# Patient Record
Sex: MALE | Race: BLACK OR AFRICAN AMERICAN | NOT HISPANIC OR LATINO | URBAN - METROPOLITAN AREA
[De-identification: names, ages, dates, MRNs, and addresses within clinical notes are randomized per-mention and may not be internally consistent; named-entity substitution may affect disease eponyms.]

---

## 2017-08-14 ENCOUNTER — HOSPITAL ENCOUNTER (EMERGENCY)
Facility: HOSPITAL | Age: 27
Discharge: HOME | End: 2017-08-14
Attending: EMERGENCY MEDICINE

## 2017-08-14 VITALS
SYSTOLIC BLOOD PRESSURE: 127 MMHG | HEIGHT: 67 IN | OXYGEN SATURATION: 97 % | WEIGHT: 250 LBS | DIASTOLIC BLOOD PRESSURE: 65 MMHG | RESPIRATION RATE: 18 BRPM | BODY MASS INDEX: 39.24 KG/M2 | TEMPERATURE: 99 F | HEART RATE: 77 BPM

## 2017-08-14 DIAGNOSIS — J02.9 SORE THROAT: ICD-10-CM

## 2017-08-14 DIAGNOSIS — R50.9 FEVER, UNSPECIFIED FEVER CAUSE: Primary | ICD-10-CM

## 2017-08-14 LAB
ALBUMIN SERPL BCP-MCNC: 3.2 G/DL
ALP SERPL-CCNC: 139 U/L
ALT SERPL W/O P-5'-P-CCNC: 86 U/L
ANION GAP SERPL CALC-SCNC: 10 MMOL/L
AST SERPL-CCNC: 93 U/L
BASOPHILS # BLD AUTO: 0.02 K/UL
BASOPHILS NFR BLD: 0.2 %
BILIRUB SERPL-MCNC: 0.9 MG/DL
BUN SERPL-MCNC: 11 MG/DL
CALCIUM SERPL-MCNC: 9.5 MG/DL
CHLORIDE SERPL-SCNC: 100 MMOL/L
CO2 SERPL-SCNC: 24 MMOL/L
CREAT SERPL-MCNC: 1 MG/DL
DEPRECATED S PYO AG THROAT QL EIA: NEGATIVE
DIFFERENTIAL METHOD: ABNORMAL
EOSINOPHIL # BLD AUTO: 0 K/UL
EOSINOPHIL NFR BLD: 0 %
ERYTHROCYTE [DISTWIDTH] IN BLOOD BY AUTOMATED COUNT: 13.6 %
EST. GFR  (AFRICAN AMERICAN): >60 ML/MIN/1.73 M^2
EST. GFR  (NON AFRICAN AMERICAN): >60 ML/MIN/1.73 M^2
GLUCOSE SERPL-MCNC: 110 MG/DL
HCT VFR BLD AUTO: 40.8 %
HETEROPH AB SERPL QL IA: NEGATIVE
HGB BLD-MCNC: 13.7 G/DL
LACTATE SERPL-SCNC: 1.2 MMOL/L
LYMPHOCYTES # BLD AUTO: 1.5 K/UL
LYMPHOCYTES NFR BLD: 13.9 %
MCH RBC QN AUTO: 28.2 PG
MCHC RBC AUTO-ENTMCNC: 33.6 G/DL
MCV RBC AUTO: 84 FL
MONOCYTES # BLD AUTO: 1.4 K/UL
MONOCYTES NFR BLD: 13.1 %
NEUTROPHILS # BLD AUTO: 7.6 K/UL
NEUTROPHILS NFR BLD: 72.4 %
PLATELET # BLD AUTO: 293 K/UL
PMV BLD AUTO: 10 FL
POTASSIUM SERPL-SCNC: 3.8 MMOL/L
PROT SERPL-MCNC: 8.3 G/DL
RBC # BLD AUTO: 4.86 M/UL
SODIUM SERPL-SCNC: 134 MMOL/L
WBC # BLD AUTO: 10.52 K/UL

## 2017-08-14 PROCEDURE — 80053 COMPREHEN METABOLIC PANEL: CPT

## 2017-08-14 PROCEDURE — 99285 EMERGENCY DEPT VISIT HI MDM: CPT | Mod: ,,, | Performed by: PHYSICIAN ASSISTANT

## 2017-08-14 PROCEDURE — 86645 CMV ANTIBODY IGM: CPT

## 2017-08-14 PROCEDURE — 87799 DETECT AGENT NOS DNA QUANT: CPT

## 2017-08-14 PROCEDURE — 87880 STREP A ASSAY W/OPTIC: CPT

## 2017-08-14 PROCEDURE — 25000003 PHARM REV CODE 250: Performed by: PHYSICIAN ASSISTANT

## 2017-08-14 PROCEDURE — 63600175 PHARM REV CODE 636 W HCPCS: Performed by: PHYSICIAN ASSISTANT

## 2017-08-14 PROCEDURE — 96365 THER/PROPH/DIAG IV INF INIT: CPT

## 2017-08-14 PROCEDURE — 80074 ACUTE HEPATITIS PANEL: CPT

## 2017-08-14 PROCEDURE — 96366 THER/PROPH/DIAG IV INF ADDON: CPT

## 2017-08-14 PROCEDURE — 87081 CULTURE SCREEN ONLY: CPT | Mod: 59

## 2017-08-14 PROCEDURE — 85025 COMPLETE CBC W/AUTO DIFF WBC: CPT

## 2017-08-14 PROCEDURE — 83605 ASSAY OF LACTIC ACID: CPT

## 2017-08-14 PROCEDURE — 86644 CMV ANTIBODY: CPT

## 2017-08-14 PROCEDURE — S0077 INJECTION, CLINDAMYCIN PHOSP: HCPCS | Performed by: PHYSICIAN ASSISTANT

## 2017-08-14 PROCEDURE — 87081 CULTURE SCREEN ONLY: CPT

## 2017-08-14 PROCEDURE — 99284 EMERGENCY DEPT VISIT MOD MDM: CPT | Mod: 25

## 2017-08-14 PROCEDURE — 86665 EPSTEIN-BARR CAPSID VCA: CPT

## 2017-08-14 PROCEDURE — 86308 HETEROPHILE ANTIBODY SCREEN: CPT

## 2017-08-14 PROCEDURE — 96375 TX/PRO/DX INJ NEW DRUG ADDON: CPT

## 2017-08-14 RX ORDER — CEFUROXIME AXETIL 500 MG/1
500 TABLET ORAL
COMMUNITY

## 2017-08-14 RX ORDER — DEXAMETHASONE SODIUM PHOSPHATE 4 MG/ML
8 INJECTION, SOLUTION INTRA-ARTICULAR; INTRALESIONAL; INTRAMUSCULAR; INTRAVENOUS; SOFT TISSUE
Status: COMPLETED | OUTPATIENT
Start: 2017-08-14 | End: 2017-08-14

## 2017-08-14 RX ORDER — CLINDAMYCIN PHOSPHATE 600 MG/50ML
600 INJECTION, SOLUTION INTRAVENOUS
Status: COMPLETED | OUTPATIENT
Start: 2017-08-14 | End: 2017-08-14

## 2017-08-14 RX ORDER — IBUPROFEN 800 MG/1
800 TABLET ORAL 3 TIMES DAILY
COMMUNITY

## 2017-08-14 RX ORDER — ACETAMINOPHEN 500 MG
1000 TABLET ORAL
Status: COMPLETED | OUTPATIENT
Start: 2017-08-14 | End: 2017-08-14

## 2017-08-14 RX ORDER — ONDANSETRON 2 MG/ML
4 INJECTION INTRAMUSCULAR; INTRAVENOUS
Status: COMPLETED | OUTPATIENT
Start: 2017-08-14 | End: 2017-08-14

## 2017-08-14 RX ADMIN — ONDANSETRON 4 MG: 2 INJECTION INTRAMUSCULAR; INTRAVENOUS at 02:08

## 2017-08-14 RX ADMIN — CLINDAMYCIN IN 5 PERCENT DEXTROSE 600 MG: 12 INJECTION, SOLUTION INTRAVENOUS at 02:08

## 2017-08-14 RX ADMIN — DEXAMETHASONE SODIUM PHOSPHATE 8 MG: 4 INJECTION, SOLUTION INTRAMUSCULAR; INTRAVENOUS at 02:08

## 2017-08-14 RX ADMIN — SODIUM CHLORIDE 1000 ML: 0.9 INJECTION, SOLUTION INTRAVENOUS at 02:08

## 2017-08-14 RX ADMIN — ACETAMINOPHEN 1000 MG: 500 TABLET ORAL at 02:08

## 2017-08-14 NOTE — DISCHARGE INSTRUCTIONS
Take ibuprofen and tylenol for fever and body aches. No contact sports. Continue abx until complete.

## 2017-08-14 NOTE — ED NOTES
Pt identifiers Abebe Jha was checked and is correct  LOC: The patient is awake, alert, aware of environment with an appropriate affect.  APPEARANCE: Pt , in no acute distress,  SKIN: Skin warm, dry and intact  RESPIRATORY: Airway is open and patent, respirations are spontaneous,  ABDOMEN: . Bowel sounds present complaints of n/v x 4 days.   NEUROLOGIC:  patient moving all extremities spontaneously, normal sensation in all extremities when touched with a finger. MUSCULOSKELETAL: No obvious deformities.

## 2017-08-14 NOTE — ED PROVIDER NOTES
"Encounter Date: 8/14/2017    SCRIBE #1 NOTE: I, Sonya Duarte, am scribing for, and in the presence of, Odalys Raymond PA-C.   SCRIBE #2 NOTE: I, Kala Booth, am scribing for, and in the presence of,  Dr. Napier. I have scribed the following portions of the note - the APC attestation.     History     Chief Complaint   Patient presents with    Sore Throat     fever, seen by md on sat meds not working     Time seen by provider: 2:12 PM    This is a 27 y.o. male who presents with complaint of sore throat with associated fever over the last 2 weeks with worsening over the past 4 days. Pt describe significant throat pain and states that it "feels like he is being hit with a hammer." He has been taking 3 tablets of Ibuprofen 200 mg and last took it 8 hours ago. Also endorses associated generalized body aches, intermittent headaches, dark urine, lightheadedness upon standing, coughing with streaks of blood in sputum, sneezing, and rhinorrhea. All symptoms are most severe in the morning. Pt describes an episode of SOB and vomiting last night. Pt was seen at seen at [facility] four days ago, where he was prescribed cefuroxime with no improvement. He has not completed the course of antibiotics, but denies improvement since starting them. He was then seen at the [facility] 3 days ago and diagnosed with strep throat. He was given an IM injection and discharged. Pt has 16 pills left in the bottle of cefuroxime 500 mg BID.  Reports diagnosis of gonorrhea in his throat a month ago and states it improved with treatment. States his current sore throat is much more severe. He denies diarrhea, ear pain, leg swelling, or penile discharge.      The history is provided by the patient.     Review of patient's allergies indicates:  No Known Allergies  Past Medical History:   Diagnosis Date    Gonorrhea     gonorrhea of throat 2 months ago     History reviewed. No pertinent surgical history.  Family History   Problem " Relation Age of Onset    Diabetes Mother     Diabetes Father      Social History   Substance Use Topics    Smoking status: Current Every Day Smoker     Packs/day: 0.50     Types: Cigarettes    Smokeless tobacco: Current User    Alcohol use Yes     Review of Systems   Constitutional: Positive for fever. Negative for chills and diaphoresis.   HENT: Positive for rhinorrhea, sneezing and sore throat. Negative for congestion, ear pain and postnasal drip.    Respiratory: Positive for cough and shortness of breath. Negative for chest tightness.    Cardiovascular: Negative for chest pain, palpitations and leg swelling.   Gastrointestinal: Positive for vomiting. Negative for abdominal pain, constipation, diarrhea and nausea.   Genitourinary: Negative for discharge, dysuria and hematuria.   Musculoskeletal: Positive for myalgias. Negative for arthralgias.   Neurological: Positive for light-headedness and headaches. Negative for dizziness, weakness and numbness.   All other systems reviewed and are negative.      Physical Exam     Initial Vitals [08/14/17 1203]   BP Pulse Resp Temp SpO2   139/68 (!) 112 18 (!) 103.1 °F (39.5 °C) 97 %      MAP       91.67         Physical Exam    Nursing note and vitals reviewed.  Constitutional: He appears well-developed and well-nourished. He is not diaphoretic. No distress.   HENT:   Head: Normocephalic and atraumatic.   Mouth/Throat: No tonsillar abscesses.   Bilateral tonsillar erythema, edema, and exudate noted. Uvula midline. No signs of peritonsillar abscess.   Neck: Normal range of motion. Neck supple.   Cardiovascular: Regular rhythm and normal heart sounds. Tachycardia present.  Exam reveals no gallop and no friction rub.    No murmur heard.  Pulmonary/Chest: Breath sounds normal. He has no wheezes. He has no rhonchi. He has no rales.   Abdominal: Soft. Bowel sounds are normal. There is no tenderness. There is no rebound and no guarding.   Musculoskeletal: Normal range of  motion.   Lymphadenopathy:        Head (right side): Submandibular adenopathy present.        Head (left side): Submandibular adenopathy present.     He has cervical adenopathy (bilateral anterior cervical).   Neurological: He is alert and oriented to person, place, and time.   Skin: Skin is warm and dry. No rash noted. No erythema.   Psychiatric: He has a normal mood and affect.         ED Course   Procedures  Labs Reviewed   CBC W/ AUTO DIFFERENTIAL - Abnormal; Notable for the following:        Result Value    Hemoglobin 13.7 (*)     Mono # 1.4 (*)     Lymph% 13.9 (*)     All other components within normal limits   COMPREHENSIVE METABOLIC PANEL - Abnormal; Notable for the following:     Sodium 134 (*)     Albumin 3.2 (*)     Alkaline Phosphatase 139 (*)     AST 93 (*)     ALT 86 (*)     All other components within normal limits   THROAT SCREEN, RAPID   CULTURE, GONOCOCCUS   CULTURE, STREP A,  THROAT   LACTIC ACID, PLASMA   HETEROPHILE AB SCREEN   TONG-GARDUNO VIRUS DNA, QUANTITATIVE (PCR)   TONG-BARR VIRUS ANTIBODY PANEL   CYTOMEGALOVIRUS ANTIBODY, IGG   CYTOMEGALOVIRUS (CMV) AB, IGM   HEPATITIS PANEL, ACUTE   CMV DNA, QUANTITATIVE, PCR        Imaging Results          US Abdomen Limited (Final result)  Result time 08/14/17 17:56:29    Final result by Shanda Redding MD (08/14/17 17:56:29)                 Impression:        Mild hepatomegaly.    ______________________________________     Electronically signed by resident: KEESHA ARAMBULA MD  Date:     08/14/17  Time:    17:50            As the supervising and teaching physician, I personally reviewed the images and resident's interpretation and I agree with the findings.          Electronically signed by: SHANDA REDDING MD  Date:     08/14/17  Time:    17:56              Narrative:    Time of Procedure: 08/14/17 17:00:00  Accession # [id number]    Reason for study: Elevated liver enzymes    Comparison: None.    Technique: Abdominal ultrasound was  performed.    Findings: The liver is upper limit of normal size measuring 17.2cm.  Hepatic parenchyma is homogeneous without evidence for masses.  No intra- or extrahepatic biliary ductal dilatation. The common bile duct measures 0.3 cm.  The gallbladder is unremarkable. No evidence for cholelithiasis.  Sonographic Gudino's sign is negative. The visualized portions of the pancreas, aorta and IVC appear unremarkable. The spleen is upper limit of normal in size and measures 11.7 x 5.1 cm. No ascites.                                 Medical Decision Making:   History:   Old Medical Records: I decided to obtain old medical records.  Old Records Summarized: other records.       <> Summary of Records: [facility] Was apparently seen at the [facility] ED. Told he had strep throat. Given IM injection and seen at [facility] on Thursday where he was prescribed cefuroxime with no improvement.   Clinical Tests:   Lab Tests: Ordered and Reviewed  Radiological Study: Ordered and Reviewed       APC / Resident Notes:   This is a 27 y.o. male who presents with complaint of sore throat with associated fever over the last 2 weeks with worsening over the past 4 days. Febrile at 104.5. Tachycardic and regular rhythm. Lungs are clear to auscultation. Abdomen soft. Bilateral tonsillar erythema, edema, and exudate noted. Uvula midline. No signs of peritonsillar abscess. Bilateral submandibular and anterior cervical lymphadenopathy. DDx includes but is not limited to strep pharyngitis, mononucleosis, gonorrhea of the throat, sepsis. Will obtain labs, give IV fluids, one gram of Tylenol, and Zofran, and reassess.      CBC with no leukocytosis with WBC 10.52. CMP shows elevated LFTs: Alk Phos 139, AST 93, ALT 86. Rapid strep negative. Monospot negative. Gonorrhea throat culture pending. Lactic acid 1.2.    EBV, CMV, and hepatitis added. Will obtain RUQ ultrasound.    RUQ ultrasound with no hepatomegaly.    Repeat vital  signs: 99F, Pulse 77, /65.    I have strong suspicion that symptoms caused by mononucleosis. I do not feel that he needs any further labs or imaging at this time. Stable for discharge.    He was discharged without any new prescriptions.  He will take OTC ibuprofen and/or tylenol as needed. He will continue cefuroxime as prescribed. He was given mono precautions.  He will follow up with internal medicine.  All of the patient's questions were answered.  I reviewed the patient's chart, labs, and imaging and discussed the case with my supervising physician.          Scribe Attestation:   Scribe #1: I performed the above scribed service and the documentation accurately describes the services I performed. I attest to the accuracy of the note.  Scribe #2: I performed the above scribed service and the documentation accurately describes the services I performed. I attest to the accuracy of the note.    Attending Attestation:     Physician Attestation Statement for NP/PA:   I discussed this assessment and plan of this patient with the NP/PA, but I did not personally examine the patient. The face to face encounter was performed by the NP/PA.    Other NP/PA Attestation Additions:    History of Present Illness: Sore throat         Physician Attestation for Scribe:  Physician Attestation Statement for Scribe #1: I, Odalys Raymond PA-C, reviewed documentation, as scribed by Sonya Duarte in my presence, and it is both accurate and complete.   Physician Attestation Statement for Scribe #2: I, Dr. Napier, reviewed documentation, as scribed by Kala Booth in my presence, and it is both accurate and complete. I also acknowledge and confirm the content of the note done by Ruthibe #1.              ED Course     Clinical Impression:   The primary encounter diagnosis was Fever, unspecified fever cause. A diagnosis of Sore throat was also pertinent to this visit.    Disposition:   Disposition: Discharged  Condition:  Stable                        Odalys Raymond PA-C  08/14/17 1842

## 2017-08-14 NOTE — ED TRIAGE NOTES
"Pt was seen in ER at [facility] and "was given a shot and said you have  Strep throat and sent home".  Went to [facility] on Thursday and dx with Strep Throat and sent home.  Given abx but that is making worse.  The only thing that is making it better is ice water and salt water gargles.  Please keep me here for 72 hrs to make it beter. "   "

## 2017-08-15 LAB
CMV IGG SERPL QL IA: REACTIVE
HAV IGM SERPL QL IA: NEGATIVE
HBV CORE IGM SERPL QL IA: NEGATIVE
HBV SURFACE AG SERPL QL IA: NEGATIVE
HCV AB SERPL QL IA: NEGATIVE

## 2017-08-16 LAB
BACTERIA THROAT CULT: NORMAL
CMV DNA SERPL NAA+PROBE-ACNC: NORMAL IU/ML
CMV IGM TITR SERPL: <8 U/ML
EBV DNA # BLD NAA+PROBE: NORMAL {COPIES}/ML
EBV DNA BY PCR: NORMAL
EBV EA AB TITR SER: <5 U/ML
EBV NA IGG SER QL: 52.6 U/ML
EBV VCA IGG SER QL: 89.4 U/ML
EBV VCA IGM SER-ACNC: <10 U/ML
LOWER 95% CONFIDENCE INTERVAL: NORMAL
UPPER 95% CONFIDENCE INTERVAL: NORMAL

## 2017-08-17 LAB — BACTERIA GENITAL AEROBE CULT: NORMAL
